# Patient Record
Sex: FEMALE | Race: WHITE | NOT HISPANIC OR LATINO
[De-identification: names, ages, dates, MRNs, and addresses within clinical notes are randomized per-mention and may not be internally consistent; named-entity substitution may affect disease eponyms.]

---

## 2020-04-10 ENCOUNTER — APPOINTMENT (OUTPATIENT)
Dept: DERMATOLOGY | Facility: CLINIC | Age: 30
End: 2020-04-10

## 2024-04-17 ENCOUNTER — APPOINTMENT (OUTPATIENT)
Dept: GYNECOLOGIC ONCOLOGY | Facility: CLINIC | Age: 34
End: 2024-04-17
Payer: MEDICAID

## 2024-04-17 VITALS
DIASTOLIC BLOOD PRESSURE: 81 MMHG | HEART RATE: 72 BPM | HEIGHT: 65 IN | SYSTOLIC BLOOD PRESSURE: 123 MMHG | BODY MASS INDEX: 29.84 KG/M2 | WEIGHT: 179.13 LBS

## 2024-04-17 DIAGNOSIS — Z80.0 FAMILY HISTORY OF MALIGNANT NEOPLASM OF DIGESTIVE ORGANS: ICD-10-CM

## 2024-04-17 DIAGNOSIS — Z80.8 FAMILY HISTORY OF MALIGNANT NEOPLASM OF OTHER ORGANS OR SYSTEMS: ICD-10-CM

## 2024-04-17 DIAGNOSIS — Z78.9 OTHER SPECIFIED HEALTH STATUS: ICD-10-CM

## 2024-04-17 PROCEDURE — 57454 BX/CURETT OF CERVIX W/SCOPE: CPT

## 2024-04-17 PROCEDURE — 99204 OFFICE O/P NEW MOD 45 MIN: CPT | Mod: 25

## 2024-04-17 PROCEDURE — 99459 PELVIC EXAMINATION: CPT

## 2024-04-17 NOTE — DISCUSSION/SUMMARY
[Reviewed Clinical Lab Test(s)] : Results of clinical tests were reviewed. [Reviewed Radiology Report(s)] : Radiology reports were reviewed. [FreeTextEntry1] : Patient presenting with LSIL pap, inadequate colposcopy biopsies and post-coital bleeding. Repeated colposcopy today given insufficient biopsies. -  Colposcopy today with biopsies, 12, 7 and 10 o'clock, ECC -  Will have path from outside reviewed with Ry HEBERT in 3 weeks to discuss next steps. We discussed possible cone biopsy. -  Pt verbalized understanding of plan and agrees. -  All questions answered.

## 2024-04-17 NOTE — PHYSICAL EXAM
[Chaperone Present] : A chaperone was present in the examining room during all aspects of the physical examination [43191] : A chaperone was present during the pelvic exam. [Normal] : Recto-Vaginal Exam: Normal [Fully active, able to carry on all pre-disease performance without restriction] : Status 0 - Fully active, able to carry on all pre-disease performance without restriction [FreeTextEntry2] : Tony [de-identified] : see [place], 6-7 o'clock white plaque lesion [de-identified] : mobile 8 wk size uterus, no adnexal masses

## 2024-04-17 NOTE — LETTER BODY
[Dear  ___] : Dear ~ANA, [I had the pleasure of evaluating your patient, [unfilled] for ___] : I had the pleasure of evaluating your patient, [unfilled] for [unfilled]. [Attached please find my note.] : Attached please find my note. [FreeTextEntry1] : colposcopy biopsies

## 2024-04-17 NOTE — PAST MEDICAL HISTORY
[Menstruating] : The patient is menstruating [Menarche Age ____] : age at menarche was [unfilled] [Definite ___ (Date)] : the last menstrual period was [unfilled] [Regular Cycle Intervals] : have been regular [Condoms] : uses condoms [Total Preg ___] : G[unfilled]

## 2024-04-17 NOTE — PROCEDURE
[Colposcopy] : colposcopy [LGSIL] : low grade squamous intraepithelial lesion [HPV high risk] : PCR positive for high risk HPV [Patient] : the patient [Written Consent] : written consent was obtained prior to the procedure and is detailed in the patient's record [Leukoplakia ___ o'clock] : leukoplakia at the [unfilled] ~Uo'clock position [Acetowhite ___ o'clock] : ascetowhite changes at the [unfilled] ~Uo'clock position [No Abnormalities] : no abnormalities seen [Biopsies Taken: # ___] : [unfilled] biopsies taken of the cervix [ECC Done] : an Endocervical curettage was performed.  [Biopsy Locations ___ o'clock] : the biopsies were taken at the [unfilled] o'clock position [Silver Nitrate] : silver nitrate [Direct Pressure] : direct pressure [No Complications] : none [Tolerated Well] : the patient tolerated the procedure well [Post procedure instructions and information given] : post procedure instructions and information given and reviewed with patient.

## 2024-04-17 NOTE — CHIEF COMPLAINT
The patient is a 48y Female complaining of [FreeTextEntry1] :    New Patient Consultation for LSIL pap

## 2024-04-17 NOTE — HISTORY OF PRESENT ILLNESS
[FreeTextEntry1] : GYN/Ref:  PRITESH Argueta  Ms. Sanchez, 33 years old, referred for LSIL pap, post-coital bleeding.  Follow up colpo revealed inadequate tissue for diagnosis.  She is referred for further workup.   Denies f/c/n/v/d/changes to bowel or bladder habits.  Denies unintentional weight loss or gain.  Pt does report frequent post-coital bleeding; denies AUB, periods regular.  Denies abdominal pain, bloating, back pain or any other associated symptoms.  Pt state she received Gardasil when she was early teen/preteen.  She is a non-smoker.  She has no children but would like to have children in the future.   She is not trying now.   This was her first abnormal pap smear.   Pathology: 2023 Colposcopy ([facility]) 1.  ECC: Tiny fragments of atypical squamous epithelium with features for which a dysplastic process cannot be entirely excluded.  Scant strips of benign endocervical epithelium and mucus. 2.  Cervix 2:00: Insufficient tissue for evaluation. 3.  Cervix 7:00: Specimen did not survive processing.  [facility]agin[facility]24 TVUS (office US) Uterus: 6.4 x 4.0 x 4.4 cm (fibroids x 3, largest measures 2.6 x 2.0 x 2.6 cm) Endometrium: 5.1 mm Right ovary: 3.0 x 2.4 x 2.7 cm. Left ovary: 2.7 x 2.9 x 1.9 cm. Cul-de-sac: Normal.  No free fluid visualized.  Pap: 2023: LGSIL, + HPV mRNA ([facility])  Health Maintenance: Mammogram: never - aware sbreast cancer screening begins at age 40 Colonoscopy: never - aware colon cancer screening begins at age 45 DEXA: PAP: as above

## 2024-04-23 ENCOUNTER — OUTPATIENT (OUTPATIENT)
Dept: OUTPATIENT SERVICES | Facility: HOSPITAL | Age: 34
LOS: 1 days | End: 2024-04-23
Payer: MEDICAID

## 2024-04-23 ENCOUNTER — RESULT REVIEW (OUTPATIENT)
Age: 34
End: 2024-04-23

## 2024-04-23 DIAGNOSIS — S83.519A SPRAIN OF ANTERIOR CRUCIATE LIGAMENT OF UNSPECIFIED KNEE, INITIAL ENCOUNTER: Chronic | ICD-10-CM

## 2024-04-23 DIAGNOSIS — C80.1 MALIGNANT (PRIMARY) NEOPLASM, UNSPECIFIED: ICD-10-CM

## 2024-04-23 PROCEDURE — 88321 CONSLTJ&REPRT SLD PREP ELSWR: CPT

## 2024-04-25 LAB — SURGICAL PATHOLOGY STUDY: SIGNIFICANT CHANGE UP

## 2024-04-26 DIAGNOSIS — N87.1 MODERATE CERVICAL DYSPLASIA: ICD-10-CM

## 2024-04-27 LAB — CORE LAB BIOPSY: NORMAL

## 2024-05-01 ENCOUNTER — APPOINTMENT (OUTPATIENT)
Dept: GYNECOLOGIC ONCOLOGY | Facility: CLINIC | Age: 34
End: 2024-05-01

## 2024-05-07 ENCOUNTER — OUTPATIENT (OUTPATIENT)
Dept: OUTPATIENT SERVICES | Facility: HOSPITAL | Age: 34
LOS: 1 days | End: 2024-05-07
Payer: MEDICAID

## 2024-05-07 DIAGNOSIS — S83.519A SPRAIN OF ANTERIOR CRUCIATE LIGAMENT OF UNSPECIFIED KNEE, INITIAL ENCOUNTER: Chronic | ICD-10-CM

## 2024-05-07 DIAGNOSIS — C80.1 MALIGNANT (PRIMARY) NEOPLASM, UNSPECIFIED: ICD-10-CM

## 2024-05-08 ENCOUNTER — OUTPATIENT (OUTPATIENT)
Dept: OUTPATIENT SERVICES | Facility: HOSPITAL | Age: 34
LOS: 1 days | End: 2024-05-08

## 2024-05-08 ENCOUNTER — RESULT REVIEW (OUTPATIENT)
Age: 34
End: 2024-05-08

## 2024-05-08 VITALS
DIASTOLIC BLOOD PRESSURE: 77 MMHG | HEART RATE: 61 BPM | OXYGEN SATURATION: 100 % | TEMPERATURE: 99 F | WEIGHT: 169.98 LBS | RESPIRATION RATE: 14 BRPM | SYSTOLIC BLOOD PRESSURE: 119 MMHG | HEIGHT: 65 IN

## 2024-05-08 DIAGNOSIS — S83.519A SPRAIN OF ANTERIOR CRUCIATE LIGAMENT OF UNSPECIFIED KNEE, INITIAL ENCOUNTER: Chronic | ICD-10-CM

## 2024-05-08 DIAGNOSIS — Z98.890 OTHER SPECIFIED POSTPROCEDURAL STATES: Chronic | ICD-10-CM

## 2024-05-08 DIAGNOSIS — N87.1 MODERATE CERVICAL DYSPLASIA: ICD-10-CM

## 2024-05-08 LAB
CYTOLOGY SPEC DOC CYTO: SIGNIFICANT CHANGE UP
HCG UR QL: NEGATIVE — SIGNIFICANT CHANGE UP
HCT VFR BLD CALC: 39.3 % — SIGNIFICANT CHANGE UP (ref 34.5–45)
HGB BLD-MCNC: 13.4 G/DL — SIGNIFICANT CHANGE UP (ref 11.5–15.5)
MCHC RBC-ENTMCNC: 30.5 PG — SIGNIFICANT CHANGE UP (ref 27–34)
MCHC RBC-ENTMCNC: 34.1 GM/DL — SIGNIFICANT CHANGE UP (ref 32–36)
MCV RBC AUTO: 89.5 FL — SIGNIFICANT CHANGE UP (ref 80–100)
NRBC # BLD: 0 /100 WBCS — SIGNIFICANT CHANGE UP (ref 0–0)
NRBC # FLD: 0 K/UL — SIGNIFICANT CHANGE UP (ref 0–0)
PLATELET # BLD AUTO: 307 K/UL — SIGNIFICANT CHANGE UP (ref 150–400)
RBC # BLD: 4.39 M/UL — SIGNIFICANT CHANGE UP (ref 3.8–5.2)
RBC # FLD: 12.3 % — SIGNIFICANT CHANGE UP (ref 10.3–14.5)
WBC # BLD: 8.28 K/UL — SIGNIFICANT CHANGE UP (ref 3.8–10.5)
WBC # FLD AUTO: 8.28 K/UL — SIGNIFICANT CHANGE UP (ref 3.8–10.5)

## 2024-05-08 PROCEDURE — 88321 CONSLTJ&REPRT SLD PREP ELSWR: CPT

## 2024-05-08 RX ORDER — SODIUM CHLORIDE 9 MG/ML
1000 INJECTION, SOLUTION INTRAVENOUS
Refills: 0 | Status: DISCONTINUED | OUTPATIENT
Start: 2024-05-17 | End: 2024-05-31

## 2024-05-08 NOTE — H&P PST ADULT - NSANTHOSAYNRD_GEN_A_CORE
No. JYOTHI screening performed.  STOP BANG Legend: 0-2 = LOW Risk; 3-4 = INTERMEDIATE Risk; 5-8 = HIGH Risk

## 2024-05-08 NOTE — H&P PST ADULT - HISTORY OF PRESENT ILLNESS
35y/o female scheduled for cone biopsy on 5/17/2024.  Pt states, "had abnormal pap smear underwent colposcopy."

## 2024-05-08 NOTE — H&P PST ADULT - PROBLEM SELECTOR PLAN 1
Pt scheduled for cone biopsy on 5/17/2024.  labs done results pending,  Urine cup provided.  Preop teaching done, pt able to verbalize understanding.    mediation day of procedure-  pepcid

## 2024-05-08 NOTE — H&P PST ADULT - GASTROINTESTINAL
negative soft/nontender/nondistended/normal active bowel sounds/no guarding/no rigidity/no organomegaly/no palpable evangelina/no masses palpable

## 2024-05-08 NOTE — H&P PST ADULT - ATTENDING COMMENTS
plan for EUA, cone biopsy  discussed risks including bleeding, infection, injury to bowel/bladder/vessels/nerves/ureters, risks of blood transfusion, reoperation, ICU admission  all questions answered

## 2024-05-16 ENCOUNTER — TRANSCRIPTION ENCOUNTER (OUTPATIENT)
Age: 34
End: 2024-05-16

## 2024-05-17 ENCOUNTER — TRANSCRIPTION ENCOUNTER (OUTPATIENT)
Age: 34
End: 2024-05-17

## 2024-05-17 ENCOUNTER — RESULT REVIEW (OUTPATIENT)
Age: 34
End: 2024-05-17

## 2024-05-17 ENCOUNTER — APPOINTMENT (OUTPATIENT)
Dept: GYNECOLOGIC ONCOLOGY | Facility: HOSPITAL | Age: 34
End: 2024-05-17

## 2024-05-17 ENCOUNTER — OUTPATIENT (OUTPATIENT)
Dept: OUTPATIENT SERVICES | Facility: HOSPITAL | Age: 34
LOS: 1 days | Discharge: HOME | End: 2024-05-17
Payer: MEDICAID

## 2024-05-17 VITALS
RESPIRATION RATE: 14 BRPM | OXYGEN SATURATION: 98 % | SYSTOLIC BLOOD PRESSURE: 118 MMHG | HEART RATE: 67 BPM | DIASTOLIC BLOOD PRESSURE: 79 MMHG

## 2024-05-17 VITALS
WEIGHT: 169.98 LBS | DIASTOLIC BLOOD PRESSURE: 71 MMHG | RESPIRATION RATE: 15 BRPM | SYSTOLIC BLOOD PRESSURE: 112 MMHG | HEART RATE: 65 BPM | TEMPERATURE: 99 F | OXYGEN SATURATION: 98 % | HEIGHT: 65 IN

## 2024-05-17 DIAGNOSIS — Z98.890 OTHER SPECIFIED POSTPROCEDURAL STATES: Chronic | ICD-10-CM

## 2024-05-17 DIAGNOSIS — N87.1 MODERATE CERVICAL DYSPLASIA: ICD-10-CM

## 2024-05-17 DIAGNOSIS — N87.9 DYSPLASIA OF CERVIX UTERI, UNSPECIFIED: ICD-10-CM

## 2024-05-17 LAB — HCG UR QL: NEGATIVE — SIGNIFICANT CHANGE UP

## 2024-05-17 PROCEDURE — 88305 TISSUE EXAM BY PATHOLOGIST: CPT | Mod: 26

## 2024-05-17 PROCEDURE — 88307 TISSUE EXAM BY PATHOLOGIST: CPT | Mod: 26

## 2024-05-17 PROCEDURE — 57520 CONIZATION OF CERVIX: CPT

## 2024-05-17 RX ORDER — ONDANSETRON 8 MG/1
4 TABLET, FILM COATED ORAL ONCE
Refills: 0 | Status: DISCONTINUED | OUTPATIENT
Start: 2024-05-17 | End: 2024-05-31

## 2024-05-17 RX ORDER — OXYCODONE HYDROCHLORIDE 5 MG/1
5 TABLET ORAL ONCE
Refills: 0 | Status: DISCONTINUED | OUTPATIENT
Start: 2024-05-17 | End: 2024-05-17

## 2024-05-17 RX ORDER — FENTANYL CITRATE 50 UG/ML
25 INJECTION INTRAVENOUS
Refills: 0 | Status: DISCONTINUED | OUTPATIENT
Start: 2024-05-17 | End: 2024-05-17

## 2024-05-17 RX ORDER — METOCLOPRAMIDE HCL 10 MG
10 TABLET ORAL ONCE
Refills: 0 | Status: DISCONTINUED | OUTPATIENT
Start: 2024-05-17 | End: 2024-05-31

## 2024-05-17 NOTE — ASU DISCHARGE PLAN (ADULT/PEDIATRIC) - NURSING INSTRUCTIONS
You received IV Tylenol for pain management at 815am. Please DO NOT take any Tylenol (Acetaminophen) containing products, such as Vicodin, Percocet, Excedrin, and cold medications for the next 6 hours (until 2:15 PM). DO NOT TAKE MORE THAN 3000 MG OF TYLENOL in a 24 hour period. You received IV Toradol for pain management at 8:30. Please DO NOT take Motrin/Ibuprofen/Advil/Aleve/NSAIDs (Non-Steroidal Anti-Inflammatory Drugs) for the next 6 hours (until 2:30 PM).

## 2024-05-17 NOTE — ASU DISCHARGE PLAN (ADULT/PEDIATRIC) - CARE PROVIDER_API CALL
Pepper Ingram  Gynecologic Oncology  [address] [place] [id number]  Phone: [phone]  Fax: [phone]  Scheduled Appointment: 05/29/2024

## 2024-05-17 NOTE — BRIEF OPERATIVE NOTE - OPERATION/FINDINGS
-Normal external genitalia  -Rectal exam wnl  -Lugols placed over cervix, non enhancing lesion 0.5x1ck seen at 6 o'clock position  -Stay sutures placed with Vicryl suture at 3 and 9 o'clock position  -Cone biopsy preformed uncomplicated  Excellent hemostasis noted, cervical biopsied site bovied and Monsels placed over defect.

## 2024-05-17 NOTE — ASU DISCHARGE PLAN (ADULT/PEDIATRIC) - ASU DC SPECIAL INSTRUCTIONSFT
After your surgery it is normal to experience:    •	Vaginal bleeding- can last 1-2 weeks and should not be heavier than a period. It may come and go and be red, brown or pink. Use pads, not tampons.  •	Cramping- Is common especially within the first 24 hours. This should be relieved by taking over the counter motrin, advil or Tylenol..  •	Vaginal soreness/irritation- can occur in the first few days after surgery because of the instruments that were used in the vagina. Soreness can be treated with ice pack and irritation can be taken care of with an emollient such as balmex or aquaphor that you can put directly on the irritated area.    Restrictions: For 10-14 days after the surgery you should avoid the following:    •	Tampons  •	Sex  •	Vigorous gym exercise  •	Swimming  pools and tub baths  •	Wait a day or two before going back to work    Anesthesia Precautions:  For the next 12 hours do not:   •	drive a car,  •	 operate power tools or machinery,  •	drink alcohol, beer, or wine,   •	make important personal or business decisions    Diet:   •	Resume Regular diet but Progress diet slowly     Physician Notification- Warning signs to look out for  •	Heavy Vaginal Bleeding   •	Shortness of breath or chest pain  •	Severe Abdominal Pain  •	Persistent nausea and vomiting  •	Pain not relieved by medications  •	Fever greater than 100.4®F  •	Inability to tolerate liquids or foods  •	Unable to urinate after 8 hours

## 2024-05-19 ENCOUNTER — NON-APPOINTMENT (OUTPATIENT)
Age: 34
End: 2024-05-19

## 2024-05-20 ENCOUNTER — NON-APPOINTMENT (OUTPATIENT)
Age: 34
End: 2024-05-20

## 2024-05-24 LAB — SURGICAL PATHOLOGY STUDY: SIGNIFICANT CHANGE UP

## 2024-05-29 ENCOUNTER — APPOINTMENT (OUTPATIENT)
Dept: GYNECOLOGIC ONCOLOGY | Facility: CLINIC | Age: 34
End: 2024-05-29
Payer: MEDICAID

## 2024-05-29 VITALS
HEART RATE: 70 BPM | DIASTOLIC BLOOD PRESSURE: 84 MMHG | BODY MASS INDEX: 29.82 KG/M2 | TEMPERATURE: 97.4 F | WEIGHT: 179 LBS | HEIGHT: 65 IN | SYSTOLIC BLOOD PRESSURE: 149 MMHG

## 2024-05-29 DIAGNOSIS — R87.619 UNSPECIFIED ABNORMAL CYTOLOGICAL FINDINGS IN SPECIMENS FROM CERVIX UTERI: ICD-10-CM

## 2024-05-29 PROCEDURE — 99024 POSTOP FOLLOW-UP VISIT: CPT

## 2024-05-29 NOTE — DISCUSSION/SUMMARY
[Firm] : soft [Tender] : nontender [Abnormal Bowel Sounds] : normal bowel sounds [Rebound] : no rebound tenderness [Guarding] : no guarding [Incisional Hernia] : no incisional hernia [Mass] : no palpable mass [Doing Well] : is doing well [Excellent Pain Control] : has excellent pain control [No Sign of Infection] : is showing no signs of infection [de-identified] : cone bed well healed.

## 2024-05-29 NOTE — ASSESSMENT
[FreeTextEntry1] : Patient presented postop she is overall feeling well.  We discussed pathology results which are consistent with ABIGAIL-3 with positive margins and positive ECC.  This is concerning for disease above the cone bed.  Patient is aware that she will need a repeat conization.  Although repeat conization may render additional positive margins this would be the next step in the evaluation.  She is aware that further surgery even after this repeat cone biopsy may be necessary, including possible hysterectomy.  We discussed all the possibilities in detail and patient would like to schedule the procedure once she discusses with her family.  I offered surgery 6 to 8 weeks post the original cone biopsy which would put us at July end of July.  Patient will call back this week to confirm surgery date. all questions answered in detail path results given to patient.

## 2024-05-29 NOTE — REASON FOR VISIT
[Post Op] : post op visit [de-identified] : 5/17/24 [de-identified] : EUA, cone biopsy [de-identified] : Patient doing well, no n/v/cp/sob, rico reg diet, voiding freely + BMs

## 2024-06-12 ENCOUNTER — NON-APPOINTMENT (OUTPATIENT)
Age: 34
End: 2024-06-12

## 2024-06-26 ENCOUNTER — NON-APPOINTMENT (OUTPATIENT)
Age: 34
End: 2024-06-26

## 2025-07-31 NOTE — ASU PATIENT PROFILE, ADULT - FALL HARM RISK - FALL HARM RISK
Reached out to pt in regards to a message he left concerning his [employer] paperwork. Pt states that the insurance doesn't need anything else at the moment they have received enough information to process claim.    Terra COON MA    
No indicators present

## (undated) DEVICE — SYR LUER LOK 10CC

## (undated) DEVICE — BLADE SURGICAL #15 CARBON

## (undated) DEVICE — DRSG TELFA 3 X 8

## (undated) DEVICE — ELCTR BALL LLETZ LG 5MM

## (undated) DEVICE — CONNECTOR 5 IN1 SUCTION TUBING

## (undated) DEVICE — ELCTR BOVIE PENCIL BLADE 10FT

## (undated) DEVICE — VENODYNE/SCD SLEEVE CALF MEDIUM

## (undated) DEVICE — DRSG PAD SANITARY OB

## (undated) DEVICE — GOWN LG

## (undated) DEVICE — PACK D&C

## (undated) DEVICE — ELCTR BOVIE PENCIL SMOKE EVACUATION

## (undated) DEVICE — TUBING SUCTION NONCONDUCTIVE 6MM X 12FT

## (undated) DEVICE — DRAPE TOWEL BLUE 17" X 24"

## (undated) DEVICE — APPLICATOR Q TIP 6" WOOD STEM

## (undated) DEVICE — MARKING PEN W RULER

## (undated) DEVICE — BLADE SURGICAL #11 CARBON

## (undated) DEVICE — GLV 5.5 PROTEXIS (WHITE)

## (undated) DEVICE — BASIN SET SINGLE

## (undated) DEVICE — SUT SILK 2-0 18" FS

## (undated) DEVICE — LABELS BLANK W PEN

## (undated) DEVICE — GLV 6 PROTEXIS (WHITE)

## (undated) DEVICE — POSITIONER STRAP ARMBOARD VELCRO TS-30

## (undated) DEVICE — NDL SPINAL 22G X 3.5" (BLACK)

## (undated) DEVICE — VISITEC 4X4

## (undated) DEVICE — NDL SPINAL 25G X 3.5" (BLUE)

## (undated) DEVICE — PACK PERI GYN